# Patient Record
Sex: MALE | Race: ASIAN | NOT HISPANIC OR LATINO | Employment: FULL TIME | URBAN - METROPOLITAN AREA
[De-identification: names, ages, dates, MRNs, and addresses within clinical notes are randomized per-mention and may not be internally consistent; named-entity substitution may affect disease eponyms.]

---

## 2024-04-10 ENCOUNTER — OFFICE VISIT (OUTPATIENT)
Dept: URGENT CARE | Facility: CLINIC | Age: 34
End: 2024-04-10
Payer: COMMERCIAL

## 2024-04-10 VITALS
DIASTOLIC BLOOD PRESSURE: 74 MMHG | WEIGHT: 161.5 LBS | RESPIRATION RATE: 16 BRPM | HEART RATE: 108 BPM | SYSTOLIC BLOOD PRESSURE: 120 MMHG | OXYGEN SATURATION: 97 % | TEMPERATURE: 98.7 F

## 2024-04-10 DIAGNOSIS — R68.89 FLU-LIKE SYMPTOMS: Primary | ICD-10-CM

## 2024-04-10 PROCEDURE — 99213 OFFICE O/P EST LOW 20 MIN: CPT | Performed by: PHYSICIAN ASSISTANT

## 2024-04-11 NOTE — PATIENT INSTRUCTIONS
Infection appears viral.  Recommend symptomatic treatment.    Ibuprofen or tylenol as needed for pain or fever.    Over the counter cough and cold medications to help with symptoms, such as mucinex, dayquil, nyquil.    Flonase for nasal congestion.  Consider neti-pot for nasal congestion.  Use salt water gargles for sore throat and throat lozenges.    Cough drops as needed.    Wash hands frequently to prevent the spread of infection.    Vitamin D3 2000 IU daily  Vitamin C 1g every 12 hours  Multivitamin daily  Fluids and rest  Avoid being around others until you no longer have a fever for 24 hours without the use of fever reducing medication.  Fevers can last 4-5 days.  Please see your pcp or go to the ER if your fever lasts longer than that.  If not improving over the next 7-10 days, follow up with PCP.  Symptoms may persist for 10-14 days.  A post-viral cough can last for a few months.  Symptoms will likely peak between days 4-6 of illness.     If you have worsening symptoms after a week of being sick, you should see your healthcare provider again to make sure that you do not have a secondary infection.    Go to the emergency room with any worsening symptoms.

## 2024-04-11 NOTE — PROGRESS NOTES
[facility]    NAME: Rudy Pearce is a 34 y.o. male  : 1990    MRN: [id number]  DATE: April 10, 2024  TIME: 8:11 PM    Assessment and Plan   Flu-like symptoms [R68.89]  1. Flu-like symptoms            Patient Instructions     Patient Instructions   Infection appears viral.  Recommend symptomatic treatment.    Ibuprofen or tylenol as needed for pain or fever.    Over the counter cough and cold medications to help with symptoms, such as mucinex, dayquil, nyquil.    Flonase for nasal congestion.  Consider neti-pot for nasal congestion.  Use salt water gargles for sore throat and throat lozenges.    Cough drops as needed.    Wash hands frequently to prevent the spread of infection.    Vitamin D3 2000 IU daily  Vitamin C 1g every 12 hours  Multivitamin daily  Fluids and rest  Avoid being around others until you no longer have a fever for 24 hours without the use of fever reducing medication.  Fevers can last 4-5 days.  Please see your pcp or go to the ER if your fever lasts longer than that.  If not improving over the next 7-10 days, follow up with PCP.  Symptoms may persist for 10-14 days.  A post-viral cough can last for a few months.  Symptoms will likely peak between days 4-6 of illness.     If you have worsening symptoms after a week of being sick, you should see your healthcare provider again to make sure that you do not have a secondary infection.    Go to the emergency room with any worsening symptoms.              Chief Complaint     Chief Complaint   Patient presents with    Fever     Started today 101. Runny/stuffy nose, post nasal drip. Coughing. Chills. Taking mucinex, tylenol. Cold sx started yesterday.        History of Present Illness   34 year old male here with complaint of congestion for 2 days, fever started yesterday.  Cough, body aches, headache and flu like symptoms.        Review of Systems   Review of Systems   Constitutional:  Positive for chills and fever. Negative for fatigue.    HENT:  Positive for congestion. Negative for ear pain, postnasal drip, sinus pressure and sore throat.    Respiratory:  Positive for cough. Negative for shortness of breath and wheezing.    Musculoskeletal:  Positive for myalgias.   Neurological:  Positive for headaches.   All other systems reviewed and are negative.      Current Medications   No current outpatient medications on file.    Current Allergies     Allergies as of 04/10/2024 - Reviewed 04/10/2024   Allergen Reaction Noted    Shellfish-derived products - food allergy Hives 04/10/2024          The following portions of the patient's history were reviewed and updated as appropriate: allergies, current medications, past family history, past medical history, past social history, past surgical history and problem list.   History reviewed. No pertinent past medical history.  History reviewed. No pertinent surgical history.  History reviewed. No pertinent family history.  Social History     Socioeconomic History    Marital status: /Civil Union     Spouse name: Not on file    Number of children: Not on file    Years of education: Not on file    Highest education level: Not on file   Occupational History    Not on file   Tobacco Use    Smoking status: Never     Passive exposure: Never    Smokeless tobacco: Never   Vaping Use    Vaping status: Never Used   Substance and Sexual Activity    Alcohol use: Yes     Comment: occ    Drug use: Never    Sexual activity: Not on file   Other Topics Concern    Not on file   Social History Narrative    Not on file     Social Determinants of Health     Financial Resource Strain: Not on file   Food Insecurity: Not on file   Transportation Needs: Not on file   Physical Activity: Not on file   Stress: Not on file   Social Connections: Not on file   Intimate Partner Violence: Not on file   Housing Stability: Not on file     Medications have been verified.    Objective   /74   Pulse (!) 108   Temp 98.7 °F (37.1 °C)    Resp 16   Wt 73.3 kg (161 lb 8 oz)   SpO2 97%      Physical Exam   Physical Exam  Vitals and nursing note reviewed.   Constitutional:       General: He is not in acute distress.     Appearance: He is well-developed.   HENT:      Head: Normocephalic and atraumatic.      Right Ear: Tympanic membrane normal.      Left Ear: Tympanic membrane normal.      Nose: Congestion and rhinorrhea present. No mucosal edema.      Mouth/Throat:      Mouth: Mucous membranes are moist.      Pharynx: No posterior oropharyngeal erythema.   Cardiovascular:      Rate and Rhythm: Normal rate and regular rhythm.      Heart sounds: Normal heart sounds.   Pulmonary:      Effort: Pulmonary effort is normal. No respiratory distress.      Breath sounds: Normal breath sounds.